# Patient Record
Sex: MALE | Race: WHITE | NOT HISPANIC OR LATINO | Employment: OTHER | URBAN - NONMETROPOLITAN AREA
[De-identification: names, ages, dates, MRNs, and addresses within clinical notes are randomized per-mention and may not be internally consistent; named-entity substitution may affect disease eponyms.]

---

## 2023-08-22 ENCOUNTER — OFFICE VISIT (OUTPATIENT)
Dept: CARDIOLOGY | Facility: CLINIC | Age: 73
End: 2023-08-22
Payer: MEDICARE

## 2023-08-22 VITALS
WEIGHT: 198 LBS | DIASTOLIC BLOOD PRESSURE: 80 MMHG | HEIGHT: 67 IN | BODY MASS INDEX: 31.08 KG/M2 | HEART RATE: 80 BPM | SYSTOLIC BLOOD PRESSURE: 130 MMHG

## 2023-08-22 DIAGNOSIS — E78.5 HYPERLIPIDEMIA LDL GOAL <100: ICD-10-CM

## 2023-08-22 DIAGNOSIS — I10 PRIMARY HYPERTENSION: Primary | ICD-10-CM

## 2023-08-22 DIAGNOSIS — Z82.49 FAMILY HISTORY OF ISCHEMIC HEART DISEASE: ICD-10-CM

## 2023-08-22 DIAGNOSIS — R06.02 SOB (SHORTNESS OF BREATH) ON EXERTION: ICD-10-CM

## 2023-08-22 PROCEDURE — 99204 OFFICE O/P NEW MOD 45 MIN: CPT | Performed by: NURSE PRACTITIONER

## 2023-08-22 PROCEDURE — 93000 ELECTROCARDIOGRAM COMPLETE: CPT | Performed by: NURSE PRACTITIONER

## 2023-08-22 PROCEDURE — 3075F SYST BP GE 130 - 139MM HG: CPT | Performed by: NURSE PRACTITIONER

## 2023-08-22 PROCEDURE — 3079F DIAST BP 80-89 MM HG: CPT | Performed by: NURSE PRACTITIONER

## 2023-08-22 RX ORDER — HYDROCHLOROTHIAZIDE 12.5 MG/1
12.5 TABLET ORAL DAILY
COMMUNITY

## 2023-08-22 RX ORDER — LISINOPRIL 5 MG/1
5 TABLET ORAL DAILY
COMMUNITY

## 2023-08-22 RX ORDER — LATANOPROST 50 UG/ML
1 SOLUTION/ DROPS OPHTHALMIC NIGHTLY
COMMUNITY

## 2023-08-22 NOTE — LETTER
August 22, 2023       No Recipients    Patient: Chase Stuart   YOB: 1950   Date of Visit: 8/22/2023       Dear WERO Pastor    Chase Stuart was in my office today. Below is a copy of my note.    If you have questions, please do not hesitate to call me. I look forward to following Chase along with you.         Sincerely,        WERO Rosado        CC:   No Recipients     Subjective    Chief Complaint   Patient presents with    Establish Care     Patient referred per PCP for HTN and family hx of heart disease.    Blood pressure     He had low B/P most of life until about 12 years ago and B/P has been elevated since. He started noticing B/P had been more elevated in end of April and May. PCP increased dose of Lisinopril. He then had to decrease back to lisinopril 5 mg daily about 3 weeks ago due to low B/P. He has continued to monitor B/P at home.    Family history     Mother had history of heart disease, diabetes and possible PE when she passed away. Also had brother with heart disease.     Testing     Had echo 5/11/23 at [facility]. Had labs in May, PCP started Atorvastatin, he reports that he did not start due to list of side effects.    Aspirin     Patient does not take, does not feel he needs anything to thin blood.    Sleep apnea     He was unable to use CPAP, unable to tolerate.     Initial cardiac evaluation;    HPI    Chase Stuart is a 73 year old gentleman with hypertension, hyperlipidemia, sleep apnea and strong family history of ischemic heart disease referred for evaluation of cardiac status. He was diagnosed with hypertension approximately ten years ago treated with lisinopril. Recently, dose reduced due to hypotension. He denies anginal chest pain but does admit to mild dyspnea with exertion. No nausea, diaphoresis or decreased effort tolerance. Because of the blood pressure fluctuations, he underwent echocardiogram 5/11/2023 at [facility] which showed normal  LV wall thickness, EF >55%, normal LA size, trace MR, normal aortic root. Labs 2023: WBC 11.9, RBC 5.59, H/H17.5/50.6, platelets 215, mild neutrophilia, glucose 94, BUN/CR 18/1.1, GFR 71, NA/K140/4.7, normal LFT, A1c 5.3%, vitamin D 28.6, TSH 1.26, PSA 2.6.  , , HDL 38, . Atorvastatin prescribed by PCP but patient did not start secondary to side effects. He did not tolerate CPAP. He  quit smoking in , quit dipping in  after developing Covid and change in taste. Mother with IHD, brother had bypass surgery.       Cardiac History  Past Surgical History:   Procedure Laterality Date    ECHO - CONVERTED  2023    ([facility]) EF 55%, trace MR, normal LA, LV thickness, AO     Current Outpatient Medications   Medication Sig Dispense Refill    hydroCHLOROthiazide (HYDRODIURIL) 12.5 MG tablet Take 1 tablet by mouth Daily.      latanoprost (XALATAN) 0.005 % ophthalmic solution 1 drop Every Night.      lisinopril (PRINIVIL,ZESTRIL) 5 MG tablet Take 1 tablet by mouth Daily.      NON FORMULARY Juice Plus 2 capsules BID       No current facility-administered medications for this visit.     Lamisil [terbinafine]    Past Medical History:   Diagnosis Date    Arthritis     Glaucoma     Hx of arthroscopic knee surgery     Hyperlipidemia     Hypertension     Sleep apnea     Vitamin D deficiency      Social History     Socioeconomic History    Marital status:    Tobacco Use    Smoking status: Former     Packs/day: 1.00     Years: 34.00     Pack years: 34.00     Types: Cigarettes     Quit date:      Years since quittin.6    Smokeless tobacco: Former     Types: Chew, Snuff     Quit date:    Vaping Use    Vaping Use: Never used   Substance and Sexual Activity    Alcohol use: Not Currently    Drug use: Never    Sexual activity: Defer     Family History   Problem Relation Age of Onset    Heart disease Mother     Hypertension Mother     Diabetes Mother     Cancer  "Father     Hypertension Sister     Arthritis Sister     Heart disease Brother         open heart surgery    Diabetes Brother     Aneurysm Brother     Kidney disease Brother     Arthritis Brother     Hyperlipidemia Brother     Hypertension Brother     Heart disease Brother     Diabetes Maternal Grandfather     Hyperlipidemia Son     Hypertension Son     Hyperlipidemia Son     Hypertension Son     Hyperlipidemia Daughter     Hypertension Daughter     Breast cancer Daughter     Hyperlipidemia Daughter     Hypertension Daughter      Review of Systems   Constitutional:  Positive for fatigue. Negative for activity change.   HENT: Negative.     Eyes: Negative.    Respiratory:  Positive for shortness of breath.    Gastrointestinal: Negative.    Endocrine: Negative.    Genitourinary: Negative.    Musculoskeletal:  Positive for arthralgias.   Skin: Negative.    Allergic/Immunologic: Negative.    Neurological:  Positive for light-headedness.   Hematological: Negative.    Psychiatric/Behavioral: Negative.       Diabetes- No  Thyroid- normal    Objective    /80 (BP Location: Right arm)   Pulse 80   Ht 170.2 cm (67\")   Wt 89.8 kg (198 lb)   BMI 31.01 kg/mý     Physical Exam  Constitutional:       Appearance: Normal appearance. He is normal weight.   HENT:      Head: Normocephalic and atraumatic.      Right Ear: External ear normal.      Left Ear: External ear normal.      Nose: Nose normal.      Mouth/Throat:      Mouth: Mucous membranes are moist.   Eyes:      General: No scleral icterus.     Pupils: Pupils are equal, round, and reactive to light.   Neck:      Vascular: No carotid bruit.   Cardiovascular:      Rate and Rhythm: Normal rate and regular rhythm.      Pulses: Normal pulses.   Pulmonary:      Effort: Pulmonary effort is normal.      Breath sounds: Normal breath sounds.   Abdominal:      General: Abdomen is flat. Bowel sounds are normal.      Palpations: Abdomen is soft.   Musculoskeletal: "         General: Normal range of motion.      Cervical back: Normal range of motion.      Right lower leg: No edema.      Left lower leg: No edema.   Skin:     General: Skin is warm and dry.      Capillary Refill: Capillary refill takes less than 2 seconds.   Neurological:      General: No focal deficit present.      Mental Status: He is alert and oriented to person, place, and time.   Psychiatric:         Mood and Affect: Mood normal.         Behavior: Behavior normal.       ECG 12 Lead    Date/Time: 8/22/2023 4:27 PM  Performed by: Patti Holguin APRN  Authorized by: Patti Holguin APRN   Comparison: compared with previous ECG from 4/17/2023  Similar to previous ECG  Rhythm: sinus rhythm  Rate: normal  BPM: 80  ST Segments: ST segments normal    Clinical impression: non-specific ECG      Assessment & Plan    Diagnoses and all orders for this visit:    1. Primary hypertension (Primary)    2. Hyperlipidemia LDL goal <100    3. Family history of ischemic heart disease    4. SOB (shortness of breath) on exertion    Other orders  -     ECG 12 Lead       Clinical exam reveals normal S1, S2 with soft, systolic physiologic murmur, regular rate and rhythm. Normal peripheral pulses, no carotid bruit, no LE edema. LCTA bilaterally. EKG showed NSR with no acute ST-T changes.    HTN- well controlled with lisinopril and HCTZ, no LVH on echocardiogram. Continue same medication.     Hyperlipidemia- LDL elevated at 165, HDL 38. Explained the role of statin to stabilize plaque, reduce circulating lipid proteins. He declines statin at this time. He would consider if ischemia is noted on stress.    KEVYN- diagnosed in the past, did not tolerate CPAP, normal LA and RVSP. Weight loss may be of benefit, side lying sleep.     Mild dyspnea on exertion in a patient with dyslipidemia, HTN and strong family history. Recommend nuclear stress to evaluate exercise tolerance, blood pressure response and evaluate for ischemic changes.     Reviewed  echo, EKG and labs with him and his wife.     Further recommendations to follow. We will see him back after testing, in 6 months or sooner if needed.

## 2023-08-22 NOTE — PROGRESS NOTES
Subjective     Chief Complaint   Patient presents with    Establish Care     Patient referred per PCP for HTN and family hx of heart disease.    Blood pressure     He had low B/P most of life until about 12 years ago and B/P has been elevated since. He started noticing B/P had been more elevated in end of April and May. PCP increased dose of Lisinopril. He then had to decrease back to lisinopril 5 mg daily about 3 weeks ago due to low B/P. He has continued to monitor B/P at home.    Family history     Mother had history of heart disease, diabetes and possible PE when she passed away. Also had brother with heart disease.     Testing     Had echo 5/11/23 at [facility]. Had labs in May, PCP started Atorvastatin, he reports that he did not start due to list of side effects.    Aspirin     Patient does not take, does not feel he needs anything to thin blood.    Sleep apnea     He was unable to use CPAP, unable to tolerate.     Initial cardiac evaluation;    HPI    Chase Stuart is a 73 year old gentleman with hypertension, hyperlipidemia, sleep apnea and strong family history of ischemic heart disease referred for evaluation of cardiac status. He was diagnosed with hypertension approximately ten years ago treated with lisinopril. Recently, dose reduced due to hypotension. He denies anginal chest pain but does admit to mild dyspnea with exertion. No nausea, diaphoresis or decreased effort tolerance. Because of the blood pressure fluctuations, he underwent echocardiogram 5/11/2023 at [facility] which showed normal LV wall thickness, EF >55%, normal LA size, trace MR, normal aortic root. Labs 4/27/2023: WBC 11.9, RBC 5.59, H/H17.5/50.6, platelets 215, mild neutrophilia, glucose 94, BUN/CR 18/1.1, GFR 71, NA/K140/4.7, normal LFT, A1c 5.3%, vitamin D 28.6, TSH 1.26, PSA 2.6.  , , HDL 38, . Atorvastatin prescribed by PCP but patient did not start secondary to side effects. He did not tolerate CPAP.  He  quit smoking in , quit dipping in  after developing Covid and change in taste. Mother with IHD, brother had bypass surgery.       Cardiac History  Past Surgical History:   Procedure Laterality Date    ECHO - CONVERTED  2023    ([facility]) EF 55%, trace MR, normal LA, LV thickness, AO     Current Outpatient Medications   Medication Sig Dispense Refill    hydroCHLOROthiazide (HYDRODIURIL) 12.5 MG tablet Take 1 tablet by mouth Daily.      latanoprost (XALATAN) 0.005 % ophthalmic solution 1 drop Every Night.      lisinopril (PRINIVIL,ZESTRIL) 5 MG tablet Take 1 tablet by mouth Daily.      NON FORMULARY Juice Plus 2 capsules BID       No current facility-administered medications for this visit.     Lamisil [terbinafine]    Past Medical History:   Diagnosis Date    Arthritis     Glaucoma     Hx of arthroscopic knee surgery     Hyperlipidemia     Hypertension     Sleep apnea     Vitamin D deficiency      Social History     Socioeconomic History    Marital status:    Tobacco Use    Smoking status: Former     Packs/day: 1.00     Years: 34.00     Pack years: 34.00     Types: Cigarettes     Quit date:      Years since quittin.6    Smokeless tobacco: Former     Types: Chew, Snuff     Quit date:    Vaping Use    Vaping Use: Never used   Substance and Sexual Activity    Alcohol use: Not Currently    Drug use: Never    Sexual activity: Defer     Family History   Problem Relation Age of Onset    Heart disease Mother     Hypertension Mother     Diabetes Mother     Cancer Father     Hypertension Sister     Arthritis Sister     Heart disease Brother         open heart surgery    Diabetes Brother     Aneurysm Brother     Kidney disease Brother     Arthritis Brother     Hyperlipidemia Brother     Hypertension Brother     Heart disease Brother     Diabetes Maternal Grandfather     Hyperlipidemia Son     Hypertension Son     Hyperlipidemia Son     Hypertension Son     Hyperlipidemia Daughter      "Hypertension Daughter     Breast cancer Daughter     Hyperlipidemia Daughter     Hypertension Daughter      Review of Systems   Constitutional:  Positive for fatigue. Negative for activity change.   HENT: Negative.     Eyes: Negative.    Respiratory:  Positive for shortness of breath.    Gastrointestinal: Negative.    Endocrine: Negative.    Genitourinary: Negative.    Musculoskeletal:  Positive for arthralgias.   Skin: Negative.    Allergic/Immunologic: Negative.    Neurological:  Positive for light-headedness.   Hematological: Negative.    Psychiatric/Behavioral: Negative.       Diabetes- No  Thyroid- normal    Objective     /80 (BP Location: Right arm)   Pulse 80   Ht 170.2 cm (67\")   Wt 89.8 kg (198 lb)   BMI 31.01 kg/mý     Physical Exam  Constitutional:       Appearance: Normal appearance. He is normal weight.   HENT:      Head: Normocephalic and atraumatic.      Right Ear: External ear normal.      Left Ear: External ear normal.      Nose: Nose normal.      Mouth/Throat:      Mouth: Mucous membranes are moist.   Eyes:      General: No scleral icterus.     Pupils: Pupils are equal, round, and reactive to light.   Neck:      Vascular: No carotid bruit.   Cardiovascular:      Rate and Rhythm: Normal rate and regular rhythm.      Pulses: Normal pulses.   Pulmonary:      Effort: Pulmonary effort is normal.      Breath sounds: Normal breath sounds.   Abdominal:      General: Abdomen is flat. Bowel sounds are normal.      Palpations: Abdomen is soft.   Musculoskeletal:         General: Normal range of motion.      Cervical back: Normal range of motion.      Right lower leg: No edema.      Left lower leg: No edema.   Skin:     General: Skin is warm and dry.      Capillary Refill: Capillary refill takes less than 2 seconds.   Neurological:      General: No focal deficit present.      Mental Status: He is alert and oriented to person, place, and time.   Psychiatric:         Mood and Affect: Mood normal.       "   Behavior: Behavior normal.       ECG 12 Lead    Date/Time: 8/22/2023 4:27 PM  Performed by: Patti Holguin APRN  Authorized by: Patti Holguin APRN   Comparison: compared with previous ECG from 4/17/2023  Similar to previous ECG  Rhythm: sinus rhythm  Rate: normal  BPM: 80  ST Segments: ST segments normal    Clinical impression: non-specific ECG      Assessment & Plan     Diagnoses and all orders for this visit:    1. Primary hypertension (Primary)    2. Hyperlipidemia LDL goal <100    3. Family history of ischemic heart disease    4. SOB (shortness of breath) on exertion    Other orders  -     ECG 12 Lead       Clinical exam reveals normal S1, S2 with soft, systolic physiologic murmur, regular rate and rhythm. Normal peripheral pulses, no carotid bruit, no LE edema. LCTA bilaterally. EKG showed NSR with no acute ST-T changes.    HTN- well controlled with lisinopril and HCTZ, no LVH on echocardiogram. Continue same medication.     Hyperlipidemia- LDL elevated at 165, HDL 38. Explained the role of statin to stabilize plaque, reduce circulating lipid proteins. He declines statin at this time. He would consider if ischemia is noted on stress.    KEVYN- diagnosed in the past, did not tolerate CPAP, normal LA and RVSP. Weight loss may be of benefit, side lying sleep.     Mild dyspnea on exertion in a patient with dyslipidemia, HTN and strong family history. Recommend nuclear stress to evaluate exercise tolerance, blood pressure response and evaluate for ischemic changes.     Reviewed echo, EKG and labs with him and his wife.     Further recommendations to follow. We will see him back after testing, in 6 months or sooner if needed.

## 2023-08-24 ENCOUNTER — PATIENT ROUNDING (BHMG ONLY) (OUTPATIENT)
Dept: CARDIOLOGY | Facility: CLINIC | Age: 73
End: 2023-08-24
Payer: MEDICARE

## 2023-08-24 NOTE — PROGRESS NOTES
August 24, 2023    Hello, may I speak with Chase Stuart?    My name is Starr RAYGOZA      I am  with [facility]  [facility]  [address] [place] [address] [phone].    Before we get started may I verify your date of birth? 1950    I am calling to officially welcome you to our practice and ask about your recent visit. Is this a good time to talk? yes    Tell me about your visit with us. What things went well?  everything went well        We're always looking for ways to make our patients' experiences even better. Do you have recommendations on ways we may improve?  no-only been there once so hard to  but I really liked the NP and was taken care of     Overall were you satisfied with your first visit to our practice? yes       I appreciate you taking the time to speak with me today. Is there anything else I can do for you? no      Thank you, and have a great day.

## 2023-09-18 ENCOUNTER — TELEPHONE (OUTPATIENT)
Dept: CARDIOLOGY | Facility: CLINIC | Age: 73
End: 2023-09-18
Payer: MEDICARE

## 2023-09-18 DIAGNOSIS — E78.5 HYPERLIPIDEMIA LDL GOAL <100: ICD-10-CM

## 2023-09-18 DIAGNOSIS — Z82.49 FAMILY HISTORY OF ISCHEMIC HEART DISEASE: ICD-10-CM

## 2023-09-18 DIAGNOSIS — I10 PRIMARY HYPERTENSION: Primary | ICD-10-CM

## 2023-09-18 DIAGNOSIS — R06.02 SOB (SHORTNESS OF BREATH) ON EXERTION: ICD-10-CM

## 2023-09-27 ENCOUNTER — HOSPITAL ENCOUNTER (OUTPATIENT)
Dept: CARDIOLOGY | Facility: HOSPITAL | Age: 73
Discharge: HOME | End: 2023-09-27
Payer: MEDICARE

## 2023-09-27 DIAGNOSIS — R06.02 SOB (SHORTNESS OF BREATH) ON EXERTION: ICD-10-CM

## 2023-09-27 DIAGNOSIS — Z82.49 FAMILY HISTORY OF ISCHEMIC HEART DISEASE: ICD-10-CM

## 2023-09-27 DIAGNOSIS — E78.5 HYPERLIPIDEMIA LDL GOAL <100: ICD-10-CM

## 2023-09-27 DIAGNOSIS — I10 PRIMARY HYPERTENSION: ICD-10-CM

## 2023-09-27 LAB
BH CV REST NUCLEAR ISOTOPE DOSE: 10 MCI
BH CV STRESS NUCLEAR ISOTOPE DOSE: 30 MCI
BH CV STRESS RECOVERY BP: NORMAL MMHG
BH CV STRESS RECOVERY HR: 96 BPM
LV EF NUC BP: 68 %
MAXIMAL PREDICTED HEART RATE: 146 BPM
PERCENT MAX PREDICTED HR: 74.66 %
STRESS BASELINE BP: NORMAL MMHG
STRESS BASELINE HR: 86 BPM
STRESS PERCENT HR: 88 %
STRESS POST ESTIMATED WORKLOAD: 7 METS
STRESS POST EXERCISE DUR MIN: 5 MIN
STRESS POST EXERCISE DUR SEC: 21 SEC
STRESS POST PEAK BP: NORMAL MMHG
STRESS POST PEAK HR: 109 BPM
STRESS TARGET HR: 124 BPM

## 2023-09-27 PROCEDURE — 0 TECHNETIUM SESTAMIBI: Performed by: INTERNAL MEDICINE

## 2023-09-27 PROCEDURE — A9500 TC99M SESTAMIBI: HCPCS | Performed by: INTERNAL MEDICINE

## 2023-09-27 PROCEDURE — 78452 HT MUSCLE IMAGE SPECT MULT: CPT

## 2023-09-27 PROCEDURE — 93017 CV STRESS TEST TRACING ONLY: CPT

## 2023-09-27 RX ADMIN — TECHNETIUM TC 99M SESTAMIBI 1 DOSE: 1 INJECTION INTRAVENOUS at 08:13

## 2023-09-27 RX ADMIN — TECHNETIUM TC 99M SESTAMIBI 1 DOSE: 1 INJECTION INTRAVENOUS at 09:29

## 2024-03-11 ENCOUNTER — OFFICE VISIT (OUTPATIENT)
Dept: CARDIOLOGY | Facility: CLINIC | Age: 74
End: 2024-03-11
Payer: MEDICARE

## 2024-03-11 VITALS
DIASTOLIC BLOOD PRESSURE: 78 MMHG | WEIGHT: 201.4 LBS | HEART RATE: 72 BPM | HEIGHT: 67 IN | BODY MASS INDEX: 31.61 KG/M2 | SYSTOLIC BLOOD PRESSURE: 150 MMHG

## 2024-03-11 DIAGNOSIS — Z82.49 FAMILY HISTORY OF ISCHEMIC HEART DISEASE: ICD-10-CM

## 2024-03-11 DIAGNOSIS — I10 PRIMARY HYPERTENSION: Primary | ICD-10-CM

## 2024-03-11 DIAGNOSIS — R06.02 SOB (SHORTNESS OF BREATH) ON EXERTION: ICD-10-CM

## 2024-03-11 DIAGNOSIS — E78.5 HYPERLIPIDEMIA LDL GOAL <100: ICD-10-CM

## 2024-03-11 PROCEDURE — 1160F RVW MEDS BY RX/DR IN RCRD: CPT | Performed by: NURSE PRACTITIONER

## 2024-03-11 PROCEDURE — 99214 OFFICE O/P EST MOD 30 MIN: CPT | Performed by: NURSE PRACTITIONER

## 2024-03-11 PROCEDURE — 1159F MED LIST DOCD IN RCRD: CPT | Performed by: NURSE PRACTITIONER

## 2024-03-11 PROCEDURE — 3078F DIAST BP <80 MM HG: CPT | Performed by: NURSE PRACTITIONER

## 2024-03-11 PROCEDURE — 3077F SYST BP >= 140 MM HG: CPT | Performed by: NURSE PRACTITIONER

## 2024-03-11 RX ORDER — LISINOPRIL 5 MG/1
5 TABLET ORAL 2 TIMES DAILY
Qty: 180 TABLET | Refills: 3 | Status: SHIPPED | OUTPATIENT
Start: 2024-03-11

## 2024-03-11 NOTE — PROGRESS NOTES
Chief Complaint   Patient presents with    Follow-up     Cardiac management    Lab     Last labs in chart.     Medication     He did not start Crestor, worried about side effects.    Med Refill     Does not need refills.     Subjective       Chase Stuart is a 73 y.o. male with HTN, hyperlipidemia, sleep apnea and strong family history of IHD referred August 2023 for evaluation of cardiac status.  Primary symptom was mild dyspnea with exertion.  Echocardiogram 5/11/2023 at [facility] showed normal LV wall thickness, EF >55%, normal LA size, trace MR, normal aortic root. Labs 4/27/2023: WBC 11.9, RBC 5.59, H/H17.5/50.6, platelets 215, mild neutrophilia, glucose 94, BUN/CR 18/1.1, GFR 71, NA/K140/4.7, normal LFT, A1c 5.3%, vitamin D 28.6, TSH 1.26, PSA 2.6.  , , HDL 38, . Atorvastatin prescribed by PCP but patient did not start secondary to side effects. He did not tolerate CPAP.     Stress test showed mild impairment of exercise tolerance, mild hypertensive response, normal LVEF and possible small lateral wall ischemia.  Medical management recommended.  Lisinopril increased to 10 mg daily.  He agreed to try Crestor 10 mg nightly.      He returns today for regular follow-up visit. He denies cardiac symptoms. No CP, SOB, palpitations. He did not increase lisinopril. He also decided against taking statin. He takes Juice Plus daily.        Cardiac History:    Past Surgical History:   Procedure Laterality Date    CARDIOVASCULAR STRESS TEST  09/27/2023    5.21 Min. 7.0 METS. 85% THR. 179/89. EF 68%. Small Lateral Ischemia    ECHO - CONVERTED  05/11/2023    ([facility]) EF 55%, trace MR, normal LA, LV thickness, AO     Current Outpatient Medications   Medication Sig Dispense Refill    hydroCHLOROthiazide (HYDRODIURIL) 12.5 MG tablet Take 1 tablet by mouth Daily.      latanoprost (XALATAN) 0.005 % ophthalmic solution 1 drop Every Night.      lisinopril (PRINIVIL,ZESTRIL) 5 MG tablet Take 1 tablet by  mouth 2 (Two) Times a Day. 180 tablet 3    NON FORMULARY Juice Plus 2 capsules BID      Probiotic Product (PROBIOTIC DAILY PO) Take  by mouth Daily.       No current facility-administered medications for this visit.     Lamisil [terbinafine]    Past Medical History:   Diagnosis Date    Arthritis     Glaucoma     Hx of arthroscopic knee surgery     Hyperlipidemia     Hypertension     Sleep apnea     Vitamin D deficiency      Social History     Socioeconomic History    Marital status:    Tobacco Use    Smoking status: Former     Current packs/day: 0.00     Average packs/day: 1 pack/day for 34.0 years (34.0 ttl pk-yrs)     Types: Cigarettes     Start date:      Quit date:      Years since quittin.2     Passive exposure: Past    Smokeless tobacco: Former     Types: Chew, Snuff     Quit date:    Vaping Use    Vaping status: Never Used   Substance and Sexual Activity    Alcohol use: Not Currently    Drug use: Never    Sexual activity: Defer     Family History   Problem Relation Age of Onset    Heart disease Mother     Hypertension Mother     Diabetes Mother     Cancer Father     Hypertension Sister     Arthritis Sister     Heart disease Brother         open heart surgery    Diabetes Brother     Aneurysm Brother     Kidney disease Brother     Arthritis Brother     Hyperlipidemia Brother     Hypertension Brother     Heart disease Brother     Diabetes Maternal Grandfather     Hyperlipidemia Son     Hypertension Son     Hyperlipidemia Son     Hypertension Son     Hyperlipidemia Daughter     Hypertension Daughter     Breast cancer Daughter     Hyperlipidemia Daughter     Hypertension Daughter      Review of Systems   Constitutional: Negative for decreased appetite and malaise/fatigue.   HENT: Negative.     Eyes:  Negative for blurred vision.   Cardiovascular:  Negative for chest pain, dyspnea on exertion, leg swelling, palpitations and syncope.   Respiratory:  Negative for shortness of breath and sleep  "disturbances due to breathing.    Endocrine: Negative.    Hematologic/Lymphatic: Negative for bleeding problem. Does not bruise/bleed easily.   Skin: Negative.    Musculoskeletal:  Negative for falls and myalgias.   Gastrointestinal:  Negative for abdominal pain, heartburn and melena.   Genitourinary:  Negative for hematuria.   Neurological:  Negative for dizziness and light-headedness.   Psychiatric/Behavioral:  Negative for altered mental status.    Allergic/Immunologic: Negative.         Objective     /78 (BP Location: Right arm)   Pulse 72   Ht 170.2 cm (67.01\")   Wt 91.4 kg (201 lb 6.4 oz)   BMI 31.54 kg/m²     Vitals and nursing note reviewed.   Constitutional:       General: Not in acute distress.     Appearance: Well-developed. Not diaphoretic.   Eyes:      Pupils: Pupils are equal, round, and reactive to light.   HENT:      Head: Normocephalic.   Pulmonary:      Effort: Pulmonary effort is normal. No respiratory distress.      Breath sounds: Normal breath sounds.   Cardiovascular:      Normal rate. Regular rhythm.   Pulses:     Intact distal pulses.   Edema:     Peripheral edema absent.   Abdominal:      General: Bowel sounds are normal.      Palpations: Abdomen is soft.   Musculoskeletal: Normal range of motion.      Cervical back: Normal range of motion. Skin:     General: Skin is warm and dry.   Neurological:      Mental Status: Alert and oriented to person, place, and time.        Procedures          Problem List Items Addressed This Visit          Cardiac and Vasculature    Primary hypertension - Primary    Relevant Medications    lisinopril (PRINIVIL,ZESTRIL) 5 MG tablet    Hyperlipidemia LDL goal <100    SOB (shortness of breath) on exertion       Family History    Family history of ischemic heart disease      HTN  -mildly elevated today  -advised to increase lisinopril to 5 mg BID, refill sent  -monitor bp and record    Hypercholesterolemia  -LDL elevated  -he declines statin  -he is not " interested in PCSK9  -encouraged Mediterranean det    Lateral wall changes on nuclear images  -reviewed stress images with pt and daughter  -small lateral ischemia vs diaphragmatic attenuation  -he is asymptomatic, therefore will continue to monitor  -explained risk of IHD with uncontrolled lipids and HTN    FU 6 months, sooner as needed.     BMI is >= 30 and <35. (Class 1 Obesity). The following options were offered after discussion;: nutrition counseling/recommendations             Electronically signed by WERO Rosado,  March 12, 2024 12:07 EDT

## 2024-03-15 ENCOUNTER — TELEPHONE (OUTPATIENT)
Dept: CARDIOLOGY | Facility: CLINIC | Age: 74
End: 2024-03-15
Payer: MEDICARE

## 2024-03-15 NOTE — TELEPHONE ENCOUNTER
Received message from Lorin at [facility] [phone]. Patient had been in office. He has been monitoring BP since increase in dose of Lisinopril, on 3/12/24 /78, 111/63, yesterday 128/82 at 8am then at 2 pm 98/63 at home. At MD office 112/68. Patient is asking if still needs to continue with increased dose due to low BP reading?    Wife reports patient is monitoring BP at home, he is currently out mowing. Advised if has any problems to call office.

## 2024-03-18 NOTE — TELEPHONE ENCOUNTER
Patient made aware to go back to original dose of lisinopril, can take an extra tablet as needed for BP >150/90. Patient voiced understanding. Attempted multiple times to contact Lorin at [facility], phone line currently not working.

## 2024-03-18 NOTE — TELEPHONE ENCOUNTER
INO FROM [facility] CALLING TO GET AN UPDATE ON THIS. ATTEMPTED TO WARM TRANSFER TO OFFICE BUT UNABLE. PLEASE CALL WITH UPDATE. THANK YOU!   [phone], OPT[facility] 3

## 2024-03-21 NOTE — TELEPHONE ENCOUNTER
Attempted multiple times to reach Lorin at [facility], unable to reach, and left message for call back. Patient is aware of recommendations.

## 2024-09-30 ENCOUNTER — OFFICE VISIT (OUTPATIENT)
Dept: CARDIOLOGY | Facility: CLINIC | Age: 74
End: 2024-09-30
Payer: MEDICARE

## 2024-09-30 VITALS
BODY MASS INDEX: 30.64 KG/M2 | HEART RATE: 80 BPM | DIASTOLIC BLOOD PRESSURE: 76 MMHG | WEIGHT: 195.2 LBS | HEIGHT: 67 IN | SYSTOLIC BLOOD PRESSURE: 122 MMHG

## 2024-09-30 DIAGNOSIS — E78.5 HYPERLIPIDEMIA LDL GOAL <100: ICD-10-CM

## 2024-09-30 DIAGNOSIS — I10 PRIMARY HYPERTENSION: Primary | ICD-10-CM

## 2024-09-30 DIAGNOSIS — Z82.49 FAMILY HISTORY OF ISCHEMIC HEART DISEASE: ICD-10-CM

## 2024-09-30 DIAGNOSIS — R94.39 ABNORMAL NUCLEAR STRESS TEST: ICD-10-CM

## 2024-09-30 PROCEDURE — 99214 OFFICE O/P EST MOD 30 MIN: CPT | Performed by: NURSE PRACTITIONER

## 2024-09-30 PROCEDURE — 3074F SYST BP LT 130 MM HG: CPT | Performed by: NURSE PRACTITIONER

## 2024-09-30 PROCEDURE — 3078F DIAST BP <80 MM HG: CPT | Performed by: NURSE PRACTITIONER

## 2024-09-30 PROCEDURE — 1160F RVW MEDS BY RX/DR IN RCRD: CPT | Performed by: NURSE PRACTITIONER

## 2024-09-30 PROCEDURE — 1159F MED LIST DOCD IN RCRD: CPT | Performed by: NURSE PRACTITIONER

## 2024-09-30 RX ORDER — LISINOPRIL 5 MG/1
TABLET ORAL
Qty: 180 TABLET | Refills: 3 | Status: SHIPPED | OUTPATIENT
Start: 2024-09-30

## 2024-09-30 NOTE — PROGRESS NOTES
"Chief Complaint   Patient presents with    Follow-up     Cardiac management    Lab     No current labs.    Blood pressure     Had been elevated, he felt related to eating a lot of [place] fruit. Has been been since he stopped eating the [place] fruit.    Med Refill     Needs refills on Lisinopril-90 day     Subjective       Chase Stuart is a 74 y.o. male with HTN, hyperlipidemia, sleep apnea and strong family history of IHD referred August 2023 for cardiac evaluation. CC mild dyspnea with exertion.  Echocardiogram 5/11/2023 at [facility] showed normal LV wall thickness, EF >55%, normal LA size, trace MR, normal aortic root. Labs 4/27/2023: WBC 11.9, RBC 5.59, H/H 17.5/50.6, platelets 215, mild neutrophilia, glucose 94, BUN/CR 18/1.1, GFR 71, NA/K 140/4.7, normal LFT, A1c 5.3%, vitamin D 28.6, TSH 1.26, PSA 2.6.  , , HDL 38, . Atorvastatin prescribed by PCP but patient did not start secondary to side effects. He did not tolerate CPAP.      Stress test showed mild impairment of exercise tolerance, mild hypertensive response, normal LVEF and possible small lateral wall ischemia.  Medical management recommended.  Lisinopril increased to 10 mg daily but he called with hypotension and reduced back to 5 mg.  He agreed to try Crestor 10 mg nightly, later changed his mind.     He returns today for regular follow-up visit with his wife. He denies cardiac symptoms. No CP, SOB, palpitations. He recently had an elevation in blood pressure for several days he associated with \"eating too much [place] fruit\".  Lisinopril to 10 mg daily and blood pressure responded.  He continues to take Juice Plus daily.  He remains active.  He is cutting down on red meat, eating more fish.  No recent labs.  He prefers to go to [facility] for labs where his daughter works.            Cardiac History:    Past Surgical History:   Procedure Laterality Date    CARDIOVASCULAR STRESS TEST  09/27/2023    5.21 Min. 7.0 METS. 85% " THR. 179/89. EF 68%. Small Lateral Ischemia    ECHO - CONVERTED  2023    ([facility]) EF 55%, trace MR, normal LA, LV thickness, AO     Current Outpatient Medications   Medication Sig Dispense Refill    B Complex Vitamins (VITAMIN-B COMPLEX PO) Take  by mouth Daily.      Cholecalciferol (VITAMIN D-3 PO) Take  by mouth Daily.      hydroCHLOROthiazide (HYDRODIURIL) 12.5 MG tablet Take 1 tablet by mouth Daily.      latanoprost (XALATAN) 0.005 % ophthalmic solution 1 drop Every Night.      lisinopril (PRINIVIL,ZESTRIL) 5 MG tablet Take 2 tablets daily 180 tablet 3    NON FORMULARY Juice Plus 2 capsules BID      Probiotic Product (PROBIOTIC DAILY PO) Take  by mouth Daily.       No current facility-administered medications for this visit.     Lamisil [terbinafine]    Past Medical History:   Diagnosis Date    Arthritis     Glaucoma     Hx of arthroscopic knee surgery     Hyperlipidemia     Hypertension     Sleep apnea     Vitamin D deficiency      Social History     Socioeconomic History    Marital status:    Tobacco Use    Smoking status: Former     Current packs/day: 0.00     Average packs/day: 1 pack/day for 34.0 years (34.0 ttl pk-yrs)     Types: Cigarettes     Start date:      Quit date:      Years since quittin.7     Passive exposure: Past    Smokeless tobacco: Former     Types: Chew, Snuff     Quit date:    Vaping Use    Vaping status: Never Used   Substance and Sexual Activity    Alcohol use: Not Currently    Drug use: Never    Sexual activity: Defer     Family History   Problem Relation Age of Onset    Heart disease Mother     Hypertension Mother     Diabetes Mother     Cancer Father     Hypertension Sister     Arthritis Sister     Heart disease Brother         open heart surgery    Diabetes Brother     Aneurysm Brother     Kidney disease Brother     Arthritis Brother     Hyperlipidemia Brother     Hypertension Brother     Heart disease Brother     Diabetes Maternal Grandfather      "Hyperlipidemia Son     Hypertension Son     Hyperlipidemia Son     Hypertension Son     Hyperlipidemia Daughter     Hypertension Daughter     Breast cancer Daughter     Hyperlipidemia Daughter     Hypertension Daughter      Review of Systems   Constitutional: Positive for weight loss (-6). Negative for decreased appetite and malaise/fatigue.   HENT: Negative.     Eyes:  Negative for blurred vision.   Cardiovascular:  Negative for chest pain, dyspnea on exertion, leg swelling, palpitations and syncope.   Respiratory:  Negative for shortness of breath and sleep disturbances due to breathing.    Endocrine: Negative.    Hematologic/Lymphatic: Negative for bleeding problem. Does not bruise/bleed easily.   Skin: Negative.    Musculoskeletal:  Negative for falls and myalgias.   Gastrointestinal:  Negative for abdominal pain, heartburn and melena.   Genitourinary:  Negative for hematuria.   Neurological:  Negative for dizziness and light-headedness.   Psychiatric/Behavioral:  Negative for altered mental status.    Allergic/Immunologic: Negative.    Blood  Objective     /76 (BP Location: Left arm, Patient Position: Sitting)   Pulse 80   Ht 170.2 cm (67.01\")   Wt 88.5 kg (195 lb 3.2 oz)   BMI 30.57 kg/m²     Vitals and nursing note reviewed.   Constitutional:       General: Not in acute distress.     Appearance: Well-developed. Not diaphoretic.   Eyes:      Pupils: Pupils are equal, round, and reactive to light.   HENT:      Head: Normocephalic.   Pulmonary:      Effort: Pulmonary effort is normal. No respiratory distress.      Breath sounds: Normal breath sounds.   Cardiovascular:      Normal rate. Regular rhythm.   Pulses:     Intact distal pulses.   Edema:     Peripheral edema absent.   Abdominal:      General: Bowel sounds are normal.      Palpations: Abdomen is soft.   Musculoskeletal: Normal range of motion.      Cervical back: Normal range of motion. Skin:     General: Skin is warm and dry.   Neurological:    "   Mental Status: Alert and oriented to person, place, and time.        Procedures          Problem List Items Addressed This Visit          Cardiac and Vasculature    Primary hypertension - Primary    Relevant Medications    lisinopril (PRINIVIL,ZESTRIL) 5 MG tablet    Hyperlipidemia LDL goal <100       Family History    Family history of ischemic heart disease      HTN  -Well-controlled with lisinopril 10 mg daily, HCTZ 12.5 mg  -Refill sent  -He follows low-sodium diet, continue weight loss efforts    Hypercholesterolemia  -, HDL 38  -Crestor versus PCSK9 recommended but he declined  -Continue heart healthy diet, encourage Mediterranean style diet    Lateral wall changes on nuclear images  -reviewed stress images with pt and wife  -small lateral ischemia vs diaphragmatic attenuation  -he is asymptomatic, therefore will continue to monitor  -explained risk of IHD with uncontrolled lipids and HTN     Will plan to repeat stress test in the next 6 months to 1 year to reevaluate ischemia.    FU 6 months, sooner as needed.               Electronically signed by WERO Rosado,  September 30, 2024 09:39 EDT

## 2025-04-21 ENCOUNTER — OFFICE VISIT (OUTPATIENT)
Dept: CARDIOLOGY | Facility: CLINIC | Age: 75
End: 2025-04-21
Payer: MEDICARE

## 2025-04-21 VITALS
HEIGHT: 67 IN | DIASTOLIC BLOOD PRESSURE: 90 MMHG | WEIGHT: 196.2 LBS | SYSTOLIC BLOOD PRESSURE: 130 MMHG | HEART RATE: 68 BPM | BODY MASS INDEX: 30.79 KG/M2

## 2025-04-21 DIAGNOSIS — E78.5 HYPERLIPIDEMIA LDL GOAL <100: ICD-10-CM

## 2025-04-21 DIAGNOSIS — I10 PRIMARY HYPERTENSION: Primary | ICD-10-CM

## 2025-04-21 DIAGNOSIS — R94.39 ABNORMAL NUCLEAR STRESS TEST: ICD-10-CM

## 2025-04-21 DIAGNOSIS — R06.02 SOB (SHORTNESS OF BREATH) ON EXERTION: ICD-10-CM

## 2025-04-21 DIAGNOSIS — E55.9 VITAMIN D DEFICIENCY: ICD-10-CM

## 2025-04-21 DIAGNOSIS — Z82.49 FAMILY HISTORY OF ISCHEMIC HEART DISEASE: ICD-10-CM

## 2025-04-21 DIAGNOSIS — R35.1 NOCTURIA: ICD-10-CM

## 2025-04-21 DIAGNOSIS — E16.2 HYPOGLYCEMIA: ICD-10-CM

## 2025-04-21 PROCEDURE — 99214 OFFICE O/P EST MOD 30 MIN: CPT | Performed by: NURSE PRACTITIONER

## 2025-04-21 PROCEDURE — 3080F DIAST BP >= 90 MM HG: CPT | Performed by: NURSE PRACTITIONER

## 2025-04-21 PROCEDURE — 3075F SYST BP GE 130 - 139MM HG: CPT | Performed by: NURSE PRACTITIONER

## 2025-04-21 PROCEDURE — 1160F RVW MEDS BY RX/DR IN RCRD: CPT | Performed by: NURSE PRACTITIONER

## 2025-04-21 PROCEDURE — 1159F MED LIST DOCD IN RCRD: CPT | Performed by: NURSE PRACTITIONER

## 2025-04-21 RX ORDER — LISINOPRIL 5 MG/1
TABLET ORAL
Qty: 180 TABLET | Refills: 3 | Status: SHIPPED | OUTPATIENT
Start: 2025-04-21

## 2025-04-21 NOTE — PROGRESS NOTES
Chief Complaint   Patient presents with    Follow-up     Cardiac management    Lab     No current labs.    Blood Pressure     Reports BP has spikes at times, had been elevated recently at home yet when checked at PCP office later in day it was normal.     Med Refill     Needs refills on Lisinopril-90 day     Subjective     HPI    Chase Stuart is a 74 y.o. male with HTN, hyperlipidemia, sleep apnea and strong family history of IHD referred 2023 for cardiac evaluation. CC mild dyspnea with exertion.  Echocardiogram 2023 at [facility] [place] essentially normal.  Labs 2023: WBC 11.9, RBC 5.59, H/H 17.5/50.6, platelets 215, mild neutrophilia, glucose 94, BUN/CR 18/1.1, GFR 71, NA/K 140/4.7, normal LFT, A1c 5.3%, vitamin D 28.6, TSH 1.26, PSA 2.6.  , , HDL 38, . Atorvastatin prescribed by PCP but patient did not start secondary to side effects. He did not tolerate CPAP.      Stress test showed mild impairment of exercise tolerance, mild hypertensive response, normal LVEF and possible small lateral wall ischemia.  Medical management recommended.  Lisinopril increased to 10 mg daily but he called with hypotension and reduced back to 5 mg.  He agreed to try Crestor 10 mg nightly, later changed his mind.     He returns today for regular follow-up visit.  He denies chest pain, no significant dyspnea on exertion.  No palpitations, dizziness or TIA.  BP intermittently elevated.  He takes lisinopril 10 mg daily, HCTZ 12.5 mg daily.  No labs recently.  His brother  from ruptured aneurysm after having coronary artery bypass grafting.    Cardiac History:    Past Surgical History:   Procedure Laterality Date    CARDIOVASCULAR STRESS TEST  2023    5.21 Min. 7.0 METS. 85% THR. 179/89. EF 68%. Small Lateral Ischemia    ECHO - CONVERTED  2023    ([facility]) EF 55%, trace MR, normal LA, LV thickness, AO     Current Outpatient Medications   Medication Sig Dispense Refill    B Complex  Vitamins (VITAMIN-B COMPLEX PO) Take  by mouth Daily.      Cholecalciferol (VITAMIN D-3 PO) Take  by mouth Daily.      hydroCHLOROthiazide (HYDRODIURIL) 12.5 MG tablet Take 1 tablet by mouth Daily.      latanoprost (XALATAN) 0.005 % ophthalmic solution 1 drop Every Night.      lisinopril (PRINIVIL,ZESTRIL) 5 MG tablet Take 2 tablets daily 180 tablet 3    NON FORMULARY Juice Plus 2 capsules BID      Probiotic Product (PROBIOTIC DAILY PO) Take  by mouth Daily.       No current facility-administered medications for this visit.     Lamisil [terbinafine]    Past Medical History:   Diagnosis Date    Arthritis     Glaucoma     Hx of arthroscopic knee surgery     Hyperlipidemia     Hypertension     Sleep apnea     Vitamin D deficiency      Social History     Socioeconomic History    Marital status:    Tobacco Use    Smoking status: Former     Current packs/day: 0.00     Average packs/day: 1 pack/day for 34.0 years (34.0 ttl pk-yrs)     Types: Cigarettes     Start date:      Quit date:      Years since quittin.3     Passive exposure: Past    Smokeless tobacco: Former     Types: Chew, Snuff     Quit date:    Vaping Use    Vaping status: Never Used   Substance and Sexual Activity    Alcohol use: Not Currently    Drug use: Never    Sexual activity: Defer     Family History   Problem Relation Age of Onset    Heart disease Mother     Hypertension Mother     Diabetes Mother     Cancer Father     Hypertension Sister     Arthritis Sister     Heart disease Brother         open heart surgery    Diabetes Brother     Aneurysm Brother     Kidney disease Brother     Arthritis Brother     Hyperlipidemia Brother     Hypertension Brother     Heart disease Brother     Diabetes Maternal Grandfather     Hyperlipidemia Son     Hypertension Son     Hyperlipidemia Son     Hypertension Son     Hyperlipidemia Daughter     Hypertension Daughter     Breast cancer Daughter     Hyperlipidemia Daughter     Hypertension Daughter   "    Review of Systems   Constitutional: Negative for decreased appetite and malaise/fatigue.   HENT: Negative.     Eyes:  Negative for blurred vision.   Cardiovascular:  Negative for chest pain, dyspnea on exertion, leg swelling, palpitations and syncope.   Respiratory:  Negative for shortness of breath and sleep disturbances due to breathing.    Endocrine: Negative.    Hematologic/Lymphatic: Negative for bleeding problem. Does not bruise/bleed easily.   Skin: Negative.    Musculoskeletal:  Negative for falls and myalgias.   Gastrointestinal:  Negative for abdominal pain, heartburn and melena.   Genitourinary:  Negative for hematuria.   Neurological:  Negative for dizziness and light-headedness.   Psychiatric/Behavioral:  Negative for altered mental status.    Allergic/Immunologic: Negative.       Objective     /90 (BP Location: Left arm, Patient Position: Sitting)   Pulse 68   Ht 170.2 cm (67.01\")   Wt 89 kg (196 lb 3.2 oz)   BMI 30.72 kg/m²     Vitals and nursing note reviewed.   Constitutional:       General: Not in acute distress.     Appearance: Well-developed. Not diaphoretic.   Eyes:      Pupils: Pupils are equal, round, and reactive to light.   HENT:      Head: Normocephalic.   Pulmonary:      Effort: Pulmonary effort is normal. No respiratory distress.      Breath sounds: Normal breath sounds.   Cardiovascular:      Normal rate. Regular rhythm.   Pulses:     Intact distal pulses.   Edema:     Peripheral edema absent.   Abdominal:      General: Bowel sounds are normal.      Palpations: Abdomen is soft.   Musculoskeletal: Normal range of motion.      Cervical back: Normal range of motion. Skin:     General: Skin is warm and dry.   Neurological:      Mental Status: Alert and oriented to person, place, and time.        Procedures          Problem List Items Addressed This Visit          Cardiac and Vasculature    Primary hypertension - Primary    Relevant Medications    lisinopril (PRINIVIL,ZESTRIL) 5 " MG tablet    Other Relevant Orders    Stress Test With Myocardial Perfusion One Day    Adult Transthoracic Echo Complete W/ Cont if Necessary Per Protocol    Lipid Panel    Magnesium    TSH    Vitamin D,25-Hydroxy    Vitamin B12    CBC (No Diff)    Comprehensive Metabolic Panel    Hemoglobin A1c    Hyperlipidemia LDL goal <100    Relevant Orders    Stress Test With Myocardial Perfusion One Day    Adult Transthoracic Echo Complete W/ Cont if Necessary Per Protocol    Lipid Panel    Magnesium    TSH    Vitamin D,25-Hydroxy    Vitamin B12    CBC (No Diff)    Comprehensive Metabolic Panel    Hemoglobin A1c    SOB (shortness of breath) on exertion    Relevant Orders    Stress Test With Myocardial Perfusion One Day    Adult Transthoracic Echo Complete W/ Cont if Necessary Per Protocol    Lipid Panel    Magnesium    TSH    Vitamin D,25-Hydroxy    Vitamin B12    CBC (No Diff)    Comprehensive Metabolic Panel    Hemoglobin A1c    Abnormal nuclear stress test    Relevant Orders    Stress Test With Myocardial Perfusion One Day    Adult Transthoracic Echo Complete W/ Cont if Necessary Per Protocol    Lipid Panel    Magnesium    TSH    Vitamin D,25-Hydroxy    Vitamin B12    CBC (No Diff)    Comprehensive Metabolic Panel    Hemoglobin A1c       Family History    Family history of ischemic heart disease    Relevant Orders    Stress Test With Myocardial Perfusion One Day    Adult Transthoracic Echo Complete W/ Cont if Necessary Per Protocol    Lipid Panel    Magnesium    TSH    Vitamin D,25-Hydroxy    Vitamin B12    CBC (No Diff)    Comprehensive Metabolic Panel    Hemoglobin A1c     Other Visit Diagnoses         Nocturia        Relevant Orders    PSA DIAGNOSTIC      Hypoglycemia        Relevant Orders    Hemoglobin A1c      Vitamin D deficiency        Relevant Orders    Vitamin D,25-Hydroxy           HTN  - Elevated at 130/90, admits [facility]   - lisinopril 10 mg daily, HCTZ 12.5 mg, may take extra HCTZ  12.5 as needed  - Refill sent     Hypercholesterolemia  -, HDL 38  -Crestor versus PCSK9 recommended but he declined  -Continue heart healthy diet, encourage Mediterranean style diet  -Recheck lipids    Lateral wall changes on nuclear images  -reviewed stress images with pt and wife  -small lateral ischemia vs diaphragmatic attenuation  -he continues to be asymptomatic but with uncontrolled risk factors of hyperlipidemia and hypertension, strong family history recommend repeat stress test and echocardiogram      Further recommendations to follow.  FU 6 months, sooner as needed.     BMI is >= 30 and <35. (Class 1 Obesity). The following options were offered after discussion;: nutrition counseling/recommendations              Electronically signed by WERO Rosado,  April 21, 2025 17:07 EDT

## 2025-04-30 ENCOUNTER — HOSPITAL ENCOUNTER (OUTPATIENT)
Dept: CARDIOLOGY | Facility: HOSPITAL | Age: 75
Discharge: HOME | End: 2025-04-30
Payer: MEDICARE

## 2025-04-30 ENCOUNTER — LAB (OUTPATIENT)
Dept: LAB | Facility: HOSPITAL | Age: 75
End: 2025-04-30
Payer: MEDICARE

## 2025-04-30 DIAGNOSIS — R94.39 ABNORMAL NUCLEAR STRESS TEST: ICD-10-CM

## 2025-04-30 DIAGNOSIS — I10 PRIMARY HYPERTENSION: ICD-10-CM

## 2025-04-30 DIAGNOSIS — R06.02 SOB (SHORTNESS OF BREATH) ON EXERTION: ICD-10-CM

## 2025-04-30 DIAGNOSIS — Z82.49 FAMILY HISTORY OF ISCHEMIC HEART DISEASE: ICD-10-CM

## 2025-04-30 DIAGNOSIS — E55.9 VITAMIN D DEFICIENCY: ICD-10-CM

## 2025-04-30 DIAGNOSIS — R35.1 NOCTURIA: ICD-10-CM

## 2025-04-30 DIAGNOSIS — E78.5 HYPERLIPIDEMIA LDL GOAL <100: ICD-10-CM

## 2025-04-30 DIAGNOSIS — E16.2 HYPOGLYCEMIA: ICD-10-CM

## 2025-04-30 LAB
ALBUMIN SERPL-MCNC: 4.5 G/DL (ref 3.5–5.2)
ALBUMIN/GLOB SERPL: 1.9 G/DL
ALP SERPL-CCNC: 49 U/L (ref 39–117)
ALT SERPL W P-5'-P-CCNC: 33 U/L (ref 1–41)
ANION GAP SERPL CALCULATED.3IONS-SCNC: 9 MMOL/L (ref 5–15)
AST SERPL-CCNC: 19 U/L (ref 1–40)
BILIRUB SERPL-MCNC: 0.8 MG/DL (ref 0–1.2)
BUN SERPL-MCNC: 15 MG/DL (ref 8–23)
BUN/CREAT SERPL: 14.4 (ref 7–25)
CALCIUM SPEC-SCNC: 10.1 MG/DL (ref 8.6–10.5)
CHLORIDE SERPL-SCNC: 105 MMOL/L (ref 98–107)
CHOLEST SERPL-MCNC: 204 MG/DL (ref 0–200)
CO2 SERPL-SCNC: 26 MMOL/L (ref 22–29)
CREAT SERPL-MCNC: 1.04 MG/DL (ref 0.76–1.27)
DEPRECATED RDW RBC AUTO: 42.8 FL (ref 37–54)
EGFRCR SERPLBLD CKD-EPI 2021: 74.9 ML/MIN/1.73
ERYTHROCYTE [DISTWIDTH] IN BLOOD BY AUTOMATED COUNT: 13.2 % (ref 12.3–15.4)
GLOBULIN UR ELPH-MCNC: 2.4 GM/DL
GLUCOSE SERPL-MCNC: 102 MG/DL (ref 65–99)
HBA1C MFR BLD: 5.3 % (ref 4.8–5.6)
HCT VFR BLD AUTO: 52.8 % (ref 37.5–51)
HDLC SERPL-MCNC: 41 MG/DL (ref 40–60)
HGB BLD-MCNC: 17.5 G/DL (ref 13–17.7)
LDLC SERPL CALC-MCNC: 149 MG/DL (ref 0–100)
LDLC/HDLC SERPL: 3.6 {RATIO}
MAGNESIUM SERPL-MCNC: 2.2 MG/DL (ref 1.6–2.4)
MCH RBC QN AUTO: 29.7 PG (ref 26.6–33)
MCHC RBC AUTO-ENTMCNC: 33.1 G/DL (ref 31.5–35.7)
MCV RBC AUTO: 89.6 FL (ref 79–97)
PLATELET # BLD AUTO: 198 10*3/MM3 (ref 140–450)
PMV BLD AUTO: 10.8 FL (ref 6–12)
POTASSIUM SERPL-SCNC: 4.3 MMOL/L (ref 3.5–5.2)
PROT SERPL-MCNC: 6.9 G/DL (ref 6–8.5)
RBC # BLD AUTO: 5.89 10*6/MM3 (ref 4.14–5.8)
SODIUM SERPL-SCNC: 140 MMOL/L (ref 136–145)
TRIGL SERPL-MCNC: 76 MG/DL (ref 0–150)
TSH SERPL DL<=0.05 MIU/L-ACNC: 1.9 UIU/ML (ref 0.27–4.2)
VLDLC SERPL-MCNC: 14 MG/DL (ref 5–40)
WBC NRBC COR # BLD AUTO: 8.17 10*3/MM3 (ref 3.4–10.8)

## 2025-04-30 PROCEDURE — 84153 ASSAY OF PSA TOTAL: CPT

## 2025-04-30 PROCEDURE — 34310000005 TECHNETIUM SESTAMIBI: Performed by: INTERNAL MEDICINE

## 2025-04-30 PROCEDURE — 93017 CV STRESS TEST TRACING ONLY: CPT

## 2025-04-30 PROCEDURE — 82306 VITAMIN D 25 HYDROXY: CPT

## 2025-04-30 PROCEDURE — 83735 ASSAY OF MAGNESIUM: CPT

## 2025-04-30 PROCEDURE — 84443 ASSAY THYROID STIM HORMONE: CPT

## 2025-04-30 PROCEDURE — 83036 HEMOGLOBIN GLYCOSYLATED A1C: CPT

## 2025-04-30 PROCEDURE — 80053 COMPREHEN METABOLIC PANEL: CPT

## 2025-04-30 PROCEDURE — A9500 TC99M SESTAMIBI: HCPCS | Performed by: INTERNAL MEDICINE

## 2025-04-30 PROCEDURE — 82607 VITAMIN B-12: CPT

## 2025-04-30 PROCEDURE — 80061 LIPID PANEL: CPT

## 2025-04-30 PROCEDURE — 85027 COMPLETE CBC AUTOMATED: CPT

## 2025-04-30 PROCEDURE — 78452 HT MUSCLE IMAGE SPECT MULT: CPT

## 2025-04-30 PROCEDURE — 36415 COLL VENOUS BLD VENIPUNCTURE: CPT

## 2025-04-30 PROCEDURE — 93306 TTE W/DOPPLER COMPLETE: CPT

## 2025-04-30 RX ADMIN — TECHNETIUM TC 99M SESTAMIBI 1 DOSE: 1 INJECTION INTRAVENOUS at 07:56

## 2025-04-30 RX ADMIN — TECHNETIUM TC 99M SESTAMIBI 1 DOSE: 1 INJECTION INTRAVENOUS at 09:56

## 2025-05-01 LAB
25(OH)D3 SERPL-MCNC: 64.7 NG/ML (ref 30–100)
AV MEAN PRESS GRAD SYS DOP V1V2: 2.8 MMHG
AV VMAX SYS DOP: 108.6 CM/SEC
BH CV ECHO MEAS - ACS: 2.28 CM
BH CV ECHO MEAS - AO MAX PG: 4.7 MMHG
BH CV ECHO MEAS - AO ROOT DIAM: 3.4 CM
BH CV ECHO MEAS - AO V2 VTI: 21.1 CM
BH CV ECHO MEAS - EDV(CUBED): 46.7 ML
BH CV ECHO MEAS - EDV(MOD-SP4): 77.5 ML
BH CV ECHO MEAS - EF(MOD-SP4): 66.5 %
BH CV ECHO MEAS - ESV(CUBED): 8.2 ML
BH CV ECHO MEAS - ESV(MOD-SP4): 26 ML
BH CV ECHO MEAS - FS: 43.9 %
BH CV ECHO MEAS - IVS/LVPW: 1.05 CM
BH CV ECHO MEAS - IVSD: 1.68 CM
BH CV ECHO MEAS - LA DIMENSION: 3.2 CM
BH CV ECHO MEAS - LAT PEAK E' VEL: 6.9 CM/SEC
BH CV ECHO MEAS - LV DIASTOLIC VOL/BSA (35-75): 38.7 CM2
BH CV ECHO MEAS - LV MASS(C)D: 232.7 GRAMS
BH CV ECHO MEAS - LV SYSTOLIC VOL/BSA (12-30): 13 CM2
BH CV ECHO MEAS - LVIDD: 3.6 CM
BH CV ECHO MEAS - LVIDS: 2.02 CM
BH CV ECHO MEAS - LVPWD: 1.6 CM
BH CV ECHO MEAS - MED PEAK E' VEL: 5.8 CM/SEC
BH CV ECHO MEAS - MV A MAX VEL: 74.1 CM/SEC
BH CV ECHO MEAS - MV DEC TIME: 0.28 SEC
BH CV ECHO MEAS - MV E MAX VEL: 57 CM/SEC
BH CV ECHO MEAS - MV E/A: 0.77
BH CV ECHO MEAS - RAP SYSTOLE: 10 MMHG
BH CV ECHO MEAS - RVDD: 2.6 CM
BH CV ECHO MEAS - RVSP: 24.7 MMHG
BH CV ECHO MEAS - SV(MOD-SP4): 51.5 ML
BH CV ECHO MEAS - SVI(MOD-SP4): 25.7 ML/M2
BH CV ECHO MEAS - TR MAX PG: 14.7 MMHG
BH CV ECHO MEAS - TR MAX VEL: 191.4 CM/SEC
BH CV ECHO MEASUREMENTS AVERAGE E/E' RATIO: 8.98
BH CV REST NUCLEAR ISOTOPE DOSE: 10 MCI
BH CV STRESS NUCLEAR ISOTOPE DOSE: 30 MCI
BH CV STRESS RECOVERY BP: NORMAL MMHG
BH CV STRESS RECOVERY HR: 96 BPM
IVRT: 99 MS
LEFT ATRIUM VOLUME INDEX: 16.8 ML/M2
MAXIMAL PREDICTED HEART RATE: 145 BPM
PERCENT MAX PREDICTED HR: 86.9 %
PSA SERPL-MCNC: 1.48 NG/ML (ref 0–4)
SPECT HRT GATED+EF W RNC IV: 70 %
STRESS BASELINE BP: NORMAL MMHG
STRESS BASELINE HR: 77 BPM
STRESS PERCENT HR: 102 %
STRESS POST ESTIMATED WORKLOAD: 7 METS
STRESS POST EXERCISE DUR MIN: 5 MIN
STRESS POST EXERCISE DUR SEC: 55 SEC
STRESS POST PEAK BP: NORMAL MMHG
STRESS POST PEAK HR: 126 BPM
STRESS TARGET HR: 123 BPM
VIT B12 BLD-MCNC: 667 PG/ML (ref 211–946)